# Patient Record
Sex: FEMALE | URBAN - METROPOLITAN AREA
[De-identification: names, ages, dates, MRNs, and addresses within clinical notes are randomized per-mention and may not be internally consistent; named-entity substitution may affect disease eponyms.]

---

## 2019-01-16 ENCOUNTER — IMPORTED ENCOUNTER (OUTPATIENT)
Dept: URBAN - METROPOLITAN AREA CLINIC 31 | Facility: CLINIC | Age: 59
End: 2019-01-16

## 2019-01-16 PROBLEM — H25.813: Noted: 2019-01-16

## 2019-01-16 PROCEDURE — 92015 DETERMINE REFRACTIVE STATE: CPT

## 2019-01-16 PROCEDURE — 92004 COMPRE OPH EXAM NEW PT 1/>: CPT

## 2019-01-16 NOTE — PATIENT DISCUSSION
Discussed the risks benefits alternatives and limitations of cataract surgery including infection bleeding loss of vision retinal tears detachment. The patient stated a full understanding and a desire to proceed with the procedure in both eyes. Refractive options were reviewed. Patient has elected to be optimized for distance vision in both eyes. The patient will still need glasses for reading and to possibly fine tune distance vision. Pt will like to proceed with surgery. LEFT eye first.     RIGHT EYE TO BE SCHEDULED THREE TO FOUR WEEKS LATER . ORA discussed with patient today  . NOt a candidate for MFIOL. Brochure givenReturn for an appointment in 2 weeks with Dr. Timmy Alejandro for Admit.

## 2022-04-02 ASSESSMENT — VISUAL ACUITY
OS_CC: J1
OD_CC: J1
OD_CC: 20/50-2
OD_PH: SC 20/30 +2
OU_SC: J2
OS_CC: 20/30+2
OS_GLARE: 20/50
OD_GLARE: 20/50
OS_PH: SC 20/25 -2

## 2022-04-02 ASSESSMENT — TONOMETRY
OD_IOP_MMHG: 13
OS_IOP_MMHG: 14